# Patient Record
Sex: MALE | Race: WHITE | NOT HISPANIC OR LATINO | Employment: OTHER | URBAN - METROPOLITAN AREA
[De-identification: names, ages, dates, MRNs, and addresses within clinical notes are randomized per-mention and may not be internally consistent; named-entity substitution may affect disease eponyms.]

---

## 2017-03-14 ENCOUNTER — PREPPED CHART (OUTPATIENT)
Dept: URBAN - METROPOLITAN AREA CLINIC 35 | Facility: CLINIC | Age: 77
End: 2017-03-14

## 2017-08-25 ASSESSMENT — TONOMETRY
OS_IOP_MMHG: 11
OD_IOP_MMHG: 11

## 2017-08-25 ASSESSMENT — VISUAL ACUITY
OD_CC: J1+
OS_CC: 20/20-1
OS_CC: J1+
OD_CC: 20/25+2

## 2017-09-05 ENCOUNTER — ESTABLISHED PATIENT (OUTPATIENT)
Dept: URBAN - METROPOLITAN AREA CLINIC 35 | Facility: CLINIC | Age: 77
End: 2017-09-05

## 2017-09-05 DIAGNOSIS — H26.493: ICD-10-CM

## 2017-09-05 DIAGNOSIS — H43.812: ICD-10-CM

## 2017-09-05 DIAGNOSIS — H35.363: ICD-10-CM

## 2017-09-05 DIAGNOSIS — H40.1121: ICD-10-CM

## 2017-09-05 DIAGNOSIS — H40.021: ICD-10-CM

## 2017-09-05 PROCEDURE — G8428 CUR MEDS NOT DOCUMENT: HCPCS

## 2017-09-05 PROCEDURE — G8785 BP SCRN NO PERF AT INTERVAL: HCPCS

## 2017-09-05 PROCEDURE — 92012 INTRM OPH EXAM EST PATIENT: CPT

## 2017-09-05 PROCEDURE — 92015 DETERMINE REFRACTIVE STATE: CPT

## 2017-09-05 ASSESSMENT — VISUAL ACUITY
OD_CC: J1
OS_CC: J2
OD_CC: 20/25-1
OS_CC: 20/20-2

## 2017-09-05 ASSESSMENT — KERATOMETRY
OD_K2POWER_DIOPTERS: 43.50
OS_K1POWER_DIOPTERS: 42.25
OD_K1POWER_DIOPTERS: 42.75
OS_K2POWER_DIOPTERS: 42.75

## 2017-09-05 ASSESSMENT — TONOMETRY
OD_IOP_MMHG: 13
OS_IOP_MMHG: 14

## 2017-10-20 ENCOUNTER — VISUAL FIELD (OUTPATIENT)
Dept: URBAN - METROPOLITAN AREA CLINIC 35 | Facility: CLINIC | Age: 77
End: 2017-10-20

## 2017-10-20 DIAGNOSIS — H40.021: ICD-10-CM

## 2017-10-20 DIAGNOSIS — H40.1121: ICD-10-CM

## 2017-10-20 PROCEDURE — 92083 EXTENDED VISUAL FIELD XM: CPT

## 2017-10-20 ASSESSMENT — KERATOMETRY
OD_K2POWER_DIOPTERS: 43.50
OD_K1POWER_DIOPTERS: 42.75
OS_K1POWER_DIOPTERS: 42.25
OS_K2POWER_DIOPTERS: 42.75

## 2018-03-19 ENCOUNTER — IOP CHECK (OUTPATIENT)
Dept: URBAN - METROPOLITAN AREA CLINIC 35 | Facility: CLINIC | Age: 78
End: 2018-03-19

## 2018-03-19 DIAGNOSIS — H40.1121: ICD-10-CM

## 2018-03-19 DIAGNOSIS — H40.021: ICD-10-CM

## 2018-03-19 PROCEDURE — 92012 INTRM OPH EXAM EST PATIENT: CPT

## 2018-03-19 PROCEDURE — 92133 CPTRZD OPH DX IMG PST SGM ON: CPT

## 2018-03-19 PROCEDURE — 0517F GLAUCOMA PLAN OF CARE DOCD: CPT

## 2018-03-19 PROCEDURE — 2027F OPTIC NERVE HEAD EVAL DONE: CPT

## 2018-03-19 PROCEDURE — G8427 DOCREV CUR MEDS BY ELIG CLIN: HCPCS

## 2018-03-19 ASSESSMENT — TONOMETRY
OD_IOP_MMHG: 14
OS_IOP_MMHG: 14

## 2018-03-19 ASSESSMENT — KERATOMETRY
OD_K1POWER_DIOPTERS: 42.75
OD_K2POWER_DIOPTERS: 43.50
OS_K2POWER_DIOPTERS: 42.75
OS_K1POWER_DIOPTERS: 42.25

## 2018-03-19 ASSESSMENT — VISUAL ACUITY
OS_CC: 20/25
OD_CC: 20/30-1

## 2018-09-24 ENCOUNTER — ESTABLISHED COMPREHENSIVE EXAM (OUTPATIENT)
Dept: URBAN - METROPOLITAN AREA CLINIC 35 | Facility: CLINIC | Age: 78
End: 2018-09-24

## 2018-09-24 DIAGNOSIS — H35.363: ICD-10-CM

## 2018-09-24 DIAGNOSIS — H26.493: ICD-10-CM

## 2018-09-24 DIAGNOSIS — H40.1121: ICD-10-CM

## 2018-09-24 DIAGNOSIS — H43.812: ICD-10-CM

## 2018-09-24 DIAGNOSIS — H40.021: ICD-10-CM

## 2018-09-24 PROCEDURE — 92015 DETERMINE REFRACTIVE STATE: CPT

## 2018-09-24 PROCEDURE — 2027F OPTIC NERVE HEAD EVAL DONE: CPT

## 2018-09-24 PROCEDURE — 0517F GLAUCOMA PLAN OF CARE DOCD: CPT

## 2018-09-24 PROCEDURE — 92014 COMPRE OPH EXAM EST PT 1/>: CPT

## 2018-09-24 PROCEDURE — G8427 DOCREV CUR MEDS BY ELIG CLIN: HCPCS

## 2018-09-24 PROCEDURE — 92134 CPTRZ OPH DX IMG PST SGM RTA: CPT

## 2018-09-24 ASSESSMENT — KERATOMETRY
OD_K2POWER_DIOPTERS: 43.50
OD_K1POWER_DIOPTERS: 43.00
OS_K1POWER_DIOPTERS: 42.25
OS_K2POWER_DIOPTERS: 42.50

## 2018-09-24 ASSESSMENT — VISUAL ACUITY
OD_CC: J2
OS_CC: 20/25-2
OS_CC: J3
OD_CC: 20/30-1

## 2018-09-24 ASSESSMENT — TONOMETRY
OS_IOP_MMHG: 13
OD_IOP_MMHG: 14

## 2018-10-26 ENCOUNTER — TECH ONLY (OUTPATIENT)
Dept: URBAN - METROPOLITAN AREA CLINIC 35 | Facility: CLINIC | Age: 78
End: 2018-10-26

## 2018-10-26 DIAGNOSIS — H40.1121: ICD-10-CM

## 2018-10-26 DIAGNOSIS — H40.1111: ICD-10-CM

## 2018-10-26 PROCEDURE — 92083 EXTENDED VISUAL FIELD XM: CPT

## 2018-10-26 PROCEDURE — 99211T TECH SERVICE

## 2018-10-26 ASSESSMENT — KERATOMETRY
OD_K2POWER_DIOPTERS: 43.50
OS_K2POWER_DIOPTERS: 42.50
OS_K1POWER_DIOPTERS: 42.25
OD_K1POWER_DIOPTERS: 43.00

## 2019-03-25 ENCOUNTER — IOP CHECK (OUTPATIENT)
Dept: URBAN - METROPOLITAN AREA CLINIC 35 | Facility: CLINIC | Age: 79
End: 2019-03-25

## 2019-03-25 DIAGNOSIS — H10.503: ICD-10-CM

## 2019-03-25 DIAGNOSIS — H35.363: ICD-10-CM

## 2019-03-25 DIAGNOSIS — H40.1111: ICD-10-CM

## 2019-03-25 DIAGNOSIS — H40.1121: ICD-10-CM

## 2019-03-25 PROCEDURE — 92134 CPTRZ OPH DX IMG PST SGM RTA: CPT

## 2019-03-25 PROCEDURE — 92012 INTRM OPH EXAM EST PATIENT: CPT

## 2019-03-25 PROCEDURE — 92133 CPTRZD OPH DX IMG PST SGM ON: CPT

## 2019-03-25 ASSESSMENT — KERATOMETRY
OS_K2POWER_DIOPTERS: 42.50
OS_K1POWER_DIOPTERS: 42.25
OD_K2POWER_DIOPTERS: 43.50
OD_K1POWER_DIOPTERS: 43.00

## 2019-03-25 ASSESSMENT — VISUAL ACUITY
OD_CC: 20/25
OS_CC: 20/25

## 2019-03-25 ASSESSMENT — TONOMETRY
OS_IOP_MMHG: 14
OD_IOP_MMHG: 14

## 2019-07-10 NOTE — PATIENT DISCUSSION
MYOPIA OU: WILL TRY TO CORRECT LEFT EYE WITH SPHERICAL LENS AS OPPOSED TO TORIC LENS.  PATIENT WOULD LIKE TO TRY DAILIES TOTAL 1 IN BOTH EYES AS THE COMFORT IS BETTER IN THE LEFT EYE USING DAILIES TOTAL 1 VS THE RIGHT EYE THAT IS CURRENTLY IN AN JIMY -2.00 -1.25 X150

## 2019-07-10 NOTE — PATIENT DISCUSSION
DRY EYE SYNDROME OU: USE GOOD QUALITY ARTIFICIAL TEARS 3-4 TIMES DAILY. INSTRUCTED PATIENT NOT TO USE VISINE OR CLEAR EYES. WILL CONTINUE TO FOLLOW.

## 2019-10-15 ENCOUNTER — ESTABLISHED COMPREHENSIVE EXAM (OUTPATIENT)
Dept: URBAN - METROPOLITAN AREA CLINIC 35 | Facility: CLINIC | Age: 79
End: 2019-10-15

## 2019-10-15 DIAGNOSIS — H40.1121: ICD-10-CM

## 2019-10-15 DIAGNOSIS — H10.503: ICD-10-CM

## 2019-10-15 DIAGNOSIS — H35.363: ICD-10-CM

## 2019-10-15 DIAGNOSIS — H40.1111: ICD-10-CM

## 2019-10-15 PROCEDURE — 92015 DETERMINE REFRACTIVE STATE: CPT

## 2019-10-15 PROCEDURE — 92014 COMPRE OPH EXAM EST PT 1/>: CPT

## 2019-10-15 ASSESSMENT — TONOMETRY
OS_IOP_MMHG: 16
OD_IOP_MMHG: 16

## 2019-10-15 ASSESSMENT — KERATOMETRY
OD_K2POWER_DIOPTERS: 42.25
OS_K2POWER_DIOPTERS: 42.5
OD_K1POWER_DIOPTERS: 43
OS_K1POWER_DIOPTERS: 43.5

## 2019-10-15 ASSESSMENT — VISUAL ACUITY
OD_CC: J1
OD_CC: 20/30-1
OS_CC: 20/25-1
OS_CC: J1

## 2020-10-19 ENCOUNTER — ESTABLISHED COMPREHENSIVE EXAM (OUTPATIENT)
Dept: URBAN - METROPOLITAN AREA CLINIC 35 | Facility: CLINIC | Age: 80
End: 2020-10-19

## 2020-10-19 DIAGNOSIS — H10.503: ICD-10-CM

## 2020-10-19 DIAGNOSIS — H26.493: ICD-10-CM

## 2020-10-19 DIAGNOSIS — H40.021: ICD-10-CM

## 2020-10-19 DIAGNOSIS — H40.1121: ICD-10-CM

## 2020-10-19 DIAGNOSIS — H35.363: ICD-10-CM

## 2020-10-19 DIAGNOSIS — H40.1111: ICD-10-CM

## 2020-10-19 DIAGNOSIS — H52.03: ICD-10-CM

## 2020-10-19 PROCEDURE — 92133 CPTRZD OPH DX IMG PST SGM ON: CPT

## 2020-10-19 PROCEDURE — 92014 COMPRE OPH EXAM EST PT 1/>: CPT

## 2020-10-19 PROCEDURE — 92250 FUNDUS PHOTOGRAPHY W/I&R: CPT

## 2020-10-19 PROCEDURE — 92015 DETERMINE REFRACTIVE STATE: CPT

## 2020-10-19 ASSESSMENT — KERATOMETRY
OS_K1POWER_DIOPTERS: 43.5
OS_K2POWER_DIOPTERS: 42.75
OD_K2POWER_DIOPTERS: 42
OD_K1POWER_DIOPTERS: 43

## 2020-10-19 ASSESSMENT — VISUAL ACUITY
OS_CC: J2
OS_CC: 20/25
OD_CC: J3
OD_CC: 20/25

## 2020-10-19 ASSESSMENT — TONOMETRY
OD_IOP_MMHG: 16
OS_IOP_MMHG: 16

## 2020-12-08 ASSESSMENT — KERATOMETRY
OD_K1POWER_DIOPTERS: 43
OD_K2POWER_DIOPTERS: 42
OS_K1POWER_DIOPTERS: 43.5
OS_K2POWER_DIOPTERS: 42.75

## 2020-12-18 ENCOUNTER — TECH ONLY (OUTPATIENT)
Dept: URBAN - METROPOLITAN AREA CLINIC 35 | Facility: CLINIC | Age: 80
End: 2020-12-18

## 2020-12-18 DIAGNOSIS — H40.1111: ICD-10-CM

## 2020-12-18 DIAGNOSIS — H40.021: ICD-10-CM

## 2020-12-18 DIAGNOSIS — H40.1121: ICD-10-CM

## 2020-12-18 PROCEDURE — 92083 EXTENDED VISUAL FIELD XM: CPT

## 2020-12-18 PROCEDURE — 99211T TECH SERVICE

## 2021-04-19 ENCOUNTER — IOP CHECK (OUTPATIENT)
Dept: URBAN - METROPOLITAN AREA CLINIC 35 | Facility: CLINIC | Age: 81
End: 2021-04-19

## 2021-04-19 DIAGNOSIS — H40.021: ICD-10-CM

## 2021-04-19 DIAGNOSIS — H35.363: ICD-10-CM

## 2021-04-19 DIAGNOSIS — H40.1121: ICD-10-CM

## 2021-04-19 DIAGNOSIS — H10.503: ICD-10-CM

## 2021-04-19 DIAGNOSIS — H26.493: ICD-10-CM

## 2021-04-19 DIAGNOSIS — H40.1111: ICD-10-CM

## 2021-04-19 PROCEDURE — 92012 INTRM OPH EXAM EST PATIENT: CPT

## 2021-04-19 ASSESSMENT — KERATOMETRY
OD_K1POWER_DIOPTERS: 43
OD_K2POWER_DIOPTERS: 42
OS_K2POWER_DIOPTERS: 42.75
OS_K1POWER_DIOPTERS: 43.5

## 2021-04-19 ASSESSMENT — TONOMETRY
OS_IOP_MMHG: 16
OD_IOP_MMHG: 16

## 2021-04-19 ASSESSMENT — VISUAL ACUITY
OD_CC: 20/25
OS_CC: 20/25

## 2021-10-22 ASSESSMENT — KERATOMETRY
OS_K1POWER_DIOPTERS: 43.5
OD_K2POWER_DIOPTERS: 42
OD_K1POWER_DIOPTERS: 43
OS_K2POWER_DIOPTERS: 42.75

## 2021-10-25 ENCOUNTER — ESTABLISHED COMPREHENSIVE EXAM (OUTPATIENT)
Dept: URBAN - METROPOLITAN AREA CLINIC 35 | Facility: CLINIC | Age: 81
End: 2021-10-25

## 2021-10-25 DIAGNOSIS — H40.1121: ICD-10-CM

## 2021-10-25 DIAGNOSIS — H52.03: ICD-10-CM

## 2021-10-25 DIAGNOSIS — H40.021: ICD-10-CM

## 2021-10-25 DIAGNOSIS — H10.503: ICD-10-CM

## 2021-10-25 DIAGNOSIS — H35.363: ICD-10-CM

## 2021-10-25 DIAGNOSIS — H40.1111: ICD-10-CM

## 2021-10-25 DIAGNOSIS — H26.493: ICD-10-CM

## 2021-10-25 PROCEDURE — 92015 DETERMINE REFRACTIVE STATE: CPT

## 2021-10-25 PROCEDURE — 92014 COMPRE OPH EXAM EST PT 1/>: CPT

## 2021-10-25 PROCEDURE — 92133 CPTRZD OPH DX IMG PST SGM ON: CPT

## 2021-10-25 PROCEDURE — 92250 FUNDUS PHOTOGRAPHY W/I&R: CPT

## 2021-10-25 ASSESSMENT — VISUAL ACUITY
OS_PH: 20/30-1
OS_CC: 20/40+2
OS_CC: J3
OD_PH: 20/25
OD_CC: J2
OU_CC: J2
OD_CC: 20/40+2

## 2021-10-25 ASSESSMENT — TONOMETRY: OS_IOP_MMHG: 14

## 2022-01-19 ASSESSMENT — KERATOMETRY
OS_K2POWER_DIOPTERS: 42.75
OS_K1POWER_DIOPTERS: 43.5
OD_K2POWER_DIOPTERS: 42
OD_K1POWER_DIOPTERS: 43

## 2022-01-21 ENCOUNTER — PREPPED CHART (OUTPATIENT)
Dept: URBAN - METROPOLITAN AREA CLINIC 35 | Facility: CLINIC | Age: 82
End: 2022-01-21

## 2022-04-25 ENCOUNTER — FOLLOW UP (OUTPATIENT)
Dept: URBAN - METROPOLITAN AREA CLINIC 35 | Facility: CLINIC | Age: 82
End: 2022-04-25

## 2022-04-25 DIAGNOSIS — H40.021: ICD-10-CM

## 2022-04-25 DIAGNOSIS — H40.1131: ICD-10-CM

## 2022-04-25 DIAGNOSIS — H35.363: ICD-10-CM

## 2022-04-25 DIAGNOSIS — H10.503: ICD-10-CM

## 2022-04-25 PROCEDURE — 92012 INTRM OPH EXAM EST PATIENT: CPT

## 2022-04-25 ASSESSMENT — VISUAL ACUITY
OS_CC: 20/25-2
OD_CC: 20/25-2

## 2022-04-25 ASSESSMENT — TONOMETRY
OS_IOP_MMHG: 11
OD_IOP_MMHG: 14

## 2022-07-22 ENCOUNTER — TECH ONLY (OUTPATIENT)
Dept: URBAN - METROPOLITAN AREA CLINIC 35 | Facility: CLINIC | Age: 82
End: 2022-07-22

## 2022-07-22 DIAGNOSIS — H40.021: ICD-10-CM

## 2022-07-22 DIAGNOSIS — H40.1131: ICD-10-CM

## 2022-07-22 PROCEDURE — 92083 EXTENDED VISUAL FIELD XM: CPT

## 2022-07-22 PROCEDURE — 99211T TECH SERVICE

## 2022-10-31 ENCOUNTER — COMPREHENSIVE EXAM (OUTPATIENT)
Dept: URBAN - METROPOLITAN AREA CLINIC 35 | Facility: CLINIC | Age: 82
End: 2022-10-31

## 2022-10-31 DIAGNOSIS — H40.1131: ICD-10-CM

## 2022-10-31 DIAGNOSIS — H43.812: ICD-10-CM

## 2022-10-31 DIAGNOSIS — H35.363: ICD-10-CM

## 2022-10-31 DIAGNOSIS — H40.021: ICD-10-CM

## 2022-10-31 DIAGNOSIS — H26.493: ICD-10-CM

## 2022-10-31 DIAGNOSIS — H10.503: ICD-10-CM

## 2022-10-31 DIAGNOSIS — H52.03: ICD-10-CM

## 2022-10-31 PROCEDURE — 92014 COMPRE OPH EXAM EST PT 1/>: CPT

## 2022-10-31 PROCEDURE — 92134 CPTRZ OPH DX IMG PST SGM RTA: CPT

## 2022-10-31 PROCEDURE — 92015 DETERMINE REFRACTIVE STATE: CPT

## 2022-10-31 ASSESSMENT — TONOMETRY
OD_IOP_MMHG: 11
OS_IOP_MMHG: 11

## 2022-10-31 ASSESSMENT — VISUAL ACUITY
OS_CC: J6
OS_CC: 20/40+2
OU_CC: 20/30-2
OD_CC: 20/40-2
OU_CC: J2
OD_PH: 20/40
OD_CC: J3